# Patient Record
Sex: FEMALE | Race: WHITE | Employment: STUDENT | URBAN - METROPOLITAN AREA
[De-identification: names, ages, dates, MRNs, and addresses within clinical notes are randomized per-mention and may not be internally consistent; named-entity substitution may affect disease eponyms.]

---
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
## 2018-01-08 ENCOUNTER — OFFICE VISIT (OUTPATIENT)
Dept: OBGYN CLINIC | Facility: CLINIC | Age: 18
End: 2018-01-08

## 2018-01-08 VITALS
BODY MASS INDEX: 30.05 KG/M2 | WEIGHT: 176 LBS | SYSTOLIC BLOOD PRESSURE: 118 MMHG | HEIGHT: 64 IN | DIASTOLIC BLOOD PRESSURE: 72 MMHG | HEART RATE: 77 BPM

## 2018-01-08 DIAGNOSIS — Z01.419 WELL WOMAN EXAM WITH ROUTINE GYNECOLOGICAL EXAM: Primary | ICD-10-CM

## 2018-01-08 DIAGNOSIS — Z30.431 IUD CHECK UP: ICD-10-CM

## 2018-01-08 DIAGNOSIS — Z11.3 SCREEN FOR STD (SEXUALLY TRANSMITTED DISEASE): ICD-10-CM

## 2018-01-08 PROCEDURE — 87591 N.GONORRHOEAE DNA AMP PROB: CPT | Performed by: NURSE PRACTITIONER

## 2018-01-08 PROCEDURE — 87491 CHLMYD TRACH DNA AMP PROBE: CPT | Performed by: NURSE PRACTITIONER

## 2018-01-08 PROCEDURE — 99395 PREV VISIT EST AGE 18-39: CPT | Performed by: NURSE PRACTITIONER

## 2018-01-08 NOTE — PROGRESS NOTES
Here for new gynecology visit. 21year old G 0 P 0. Patient's last menstrual period was 12/23/2017 (exact date). .     Here for Annual Gynecologic Exam. She had a Mirena IUD placed 9/2016 and has started having cramps wit her menses since it was inserted. depression, anxiety, migraines, seizure disorders, behavioral problems. /72 (BP Location: Right arm)   Pulse 77   Ht 64\"   Wt 176 lb   LMP 12/23/2017 (Exact Date)   BMI 30.21 kg/m²     NECK:  Thyroid normal size without nodules.  No

## 2018-01-09 LAB
C TRACH DNA SPEC QL NAA+PROBE: NEGATIVE
N GONORRHOEA DNA SPEC QL NAA+PROBE: NEGATIVE

## 2018-01-10 ENCOUNTER — LABORATORY ENCOUNTER (OUTPATIENT)
Dept: LAB | Age: 18
End: 2018-01-10
Attending: OTOLARYNGOLOGY
Payer: COMMERCIAL

## 2018-01-10 DIAGNOSIS — J32.9 CHRONIC SINUSITIS: Primary | ICD-10-CM

## 2018-01-10 PROCEDURE — 88311 DECALCIFY TISSUE: CPT

## 2018-01-10 PROCEDURE — 88300 SURGICAL PATH GROSS: CPT

## 2018-01-10 PROCEDURE — 88304 TISSUE EXAM BY PATHOLOGIST: CPT

## 2018-02-16 ENCOUNTER — TELEPHONE (OUTPATIENT)
Dept: SURGERY | Facility: CLINIC | Age: 18
End: 2018-02-16

## 2018-02-16 NOTE — TELEPHONE ENCOUNTER
Received call from patient, informed her of the approval.  She waiting on a call for a pre-op appt and surgery scheduling.

## 2018-03-06 ENCOUNTER — OFFICE VISIT (OUTPATIENT)
Dept: SURGERY | Facility: CLINIC | Age: 18
End: 2018-03-06

## 2018-03-06 VITALS
SYSTOLIC BLOOD PRESSURE: 120 MMHG | HEIGHT: 64 IN | DIASTOLIC BLOOD PRESSURE: 74 MMHG | OXYGEN SATURATION: 97 % | HEART RATE: 70 BPM | BODY MASS INDEX: 28.31 KG/M2 | WEIGHT: 165.81 LBS

## 2018-03-06 DIAGNOSIS — N62 MACROMASTIA: Primary | ICD-10-CM

## 2018-03-06 PROCEDURE — 99212 OFFICE O/P EST SF 10 MIN: CPT | Performed by: SURGERY

## 2018-03-06 NOTE — PROGRESS NOTES
Liza Thrasher is a 21year old female who presents today for a follow-up. She has been approved for breast reduction and would like to proceed   He denies any changes to her health since her last visit.     Physical Examination:   03/06/18  1342   BP: 1

## 2018-03-06 NOTE — PROGRESS NOTES
Pt given pre-op instructions for Bilateral Breast Reduction. PT would like to be scheduled for surgery in May 2018 due to school schedule. Pt to get medical clearance with PCP sometime in May 2018. Pt agreed and understood.     Informed consent for the proc

## 2018-03-14 ENCOUNTER — TELEPHONE (OUTPATIENT)
Dept: SURGERY | Facility: CLINIC | Age: 18
End: 2018-03-14

## 2018-04-16 ENCOUNTER — TELEPHONE (OUTPATIENT)
Dept: SURGERY | Facility: CLINIC | Age: 18
End: 2018-04-16

## 2018-04-16 NOTE — TELEPHONE ENCOUNTER
Pt called to reschedule procedure. Wants to hold off until June to have surgery.  Rescheduled procedure to 06/20/18

## 2018-05-07 ENCOUNTER — TELEPHONE (OUTPATIENT)
Dept: SURGERY | Facility: CLINIC | Age: 18
End: 2018-05-07

## 2018-05-07 NOTE — TELEPHONE ENCOUNTER
Pt called asking to reschedule surgery for the second time. Patient is enrolled in summer school. She would like to wait until after her class ends in August to have the procedure. Rescheduled procedure to 8/8/18.  Patient would like to know how much is rula

## 2018-05-09 ENCOUNTER — TELEPHONE (OUTPATIENT)
Dept: SURGERY | Facility: CLINIC | Age: 18
End: 2018-05-09

## 2018-05-09 NOTE — TELEPHONE ENCOUNTER
Patient returning my call. Wants to know how much of her breast is going to be reduced during the surgery. Informed her that per Dr Ana Reina note, about 500g will be reduced. Patient would like to know what her size will be after the surgery.  RN suggested

## 2018-05-18 ENCOUNTER — OFFICE VISIT (OUTPATIENT)
Dept: OBGYN CLINIC | Facility: CLINIC | Age: 18
End: 2018-05-18

## 2018-05-18 VITALS
BODY MASS INDEX: 26.98 KG/M2 | WEIGHT: 160 LBS | DIASTOLIC BLOOD PRESSURE: 64 MMHG | HEART RATE: 67 BPM | SYSTOLIC BLOOD PRESSURE: 128 MMHG | HEIGHT: 64.5 IN

## 2018-05-18 DIAGNOSIS — R10.2 PELVIC PAIN: Primary | ICD-10-CM

## 2018-05-18 PROCEDURE — 99213 OFFICE O/P EST LOW 20 MIN: CPT | Performed by: NURSE PRACTITIONER

## 2018-05-18 NOTE — PROGRESS NOTES
S:  Patient is here after experiencing a week of right sided pelvic cramping. She has a Mirena IUD, menses had been monthly with 3 days of light bleeding to spotting. They recently did stop. Her pain started 2 weeks ago and lasted 1 week.  It was all day, s

## 2018-05-25 ENCOUNTER — ULTRASOUND ENCOUNTER (OUTPATIENT)
Dept: OBGYN CLINIC | Facility: CLINIC | Age: 18
End: 2018-05-25

## 2018-05-25 DIAGNOSIS — Z97.5 IUD (INTRAUTERINE DEVICE) IN PLACE: ICD-10-CM

## 2018-05-25 DIAGNOSIS — R10.2 PELVIC PAIN: Primary | ICD-10-CM

## 2018-05-25 PROCEDURE — 76856 US EXAM PELVIC COMPLETE: CPT | Performed by: OBSTETRICS & GYNECOLOGY

## 2018-05-25 PROCEDURE — 76830 TRANSVAGINAL US NON-OB: CPT | Performed by: OBSTETRICS & GYNECOLOGY

## 2018-06-29 ENCOUNTER — TELEPHONE (OUTPATIENT)
Dept: FAMILY MEDICINE CLINIC | Facility: CLINIC | Age: 18
End: 2018-06-29

## 2018-06-29 NOTE — TELEPHONE ENCOUNTER
Patient having breast reduction surgery on 8/8/18 by Dr. Kierra Rdz at the [facility]. Ferris sheet filled out.

## 2018-07-20 ENCOUNTER — OFFICE VISIT (OUTPATIENT)
Dept: FAMILY MEDICINE CLINIC | Facility: CLINIC | Age: 18
End: 2018-07-20
Payer: COMMERCIAL

## 2018-07-20 VITALS
HEIGHT: 64.5 IN | HEART RATE: 60 BPM | WEIGHT: 157 LBS | SYSTOLIC BLOOD PRESSURE: 110 MMHG | BODY MASS INDEX: 26.48 KG/M2 | DIASTOLIC BLOOD PRESSURE: 70 MMHG | OXYGEN SATURATION: 97 % | TEMPERATURE: 99 F | RESPIRATION RATE: 16 BRPM

## 2018-07-20 DIAGNOSIS — Z01.818 PREOPERATIVE GENERAL PHYSICAL EXAMINATION: Primary | ICD-10-CM

## 2018-07-20 PROCEDURE — 99214 OFFICE O/P EST MOD 30 MIN: CPT | Performed by: FAMILY MEDICINE

## 2018-07-20 NOTE — PROGRESS NOTES
Fercho Gonzalez is a 19year old female who presents for a pre-operative physical exam.   HPI related to surgery:   Fercho Gonzalez is scheduled for   Bilateral breast reduction procedure   at [address] on 8/8/18  to be perfo or hives     PHYSICAL EXAM:   /70   Pulse 60   Temp 99.1 °F (37.3 °C) (Oral)   Resp 16   Ht 64.5\"   Wt 157 lb   SpO2 97%   BMI 26.53 kg/m²    Vital signs: Blood pressure 110/70, pulse 60, temperature 99.1 °F (37.3 °C), temperature source Oral, resp.

## 2018-07-23 ENCOUNTER — TELEPHONE (OUTPATIENT)
Dept: SURGERY | Facility: CLINIC | Age: 18
End: 2018-07-23

## 2018-07-23 NOTE — TELEPHONE ENCOUNTER
DEVIN, offering for Dr. Zoran Jimenez to speak with the patient via telephone to field her questions (scheduled breast reduction on 8/8) as she is driving in from [place] for this appointment, as noted.    I will anticipate a call back and arrange for this per fransisca

## 2018-08-07 ENCOUNTER — ANESTHESIA EVENT (OUTPATIENT)
Dept: SURGERY | Facility: HOSPITAL | Age: 18
End: 2018-08-07

## 2018-08-08 ENCOUNTER — ANESTHESIA (OUTPATIENT)
Dept: SURGERY | Facility: HOSPITAL | Age: 18
End: 2018-08-08

## 2018-08-08 ENCOUNTER — SURGERY (OUTPATIENT)
Age: 18
End: 2018-08-08

## 2018-08-08 ENCOUNTER — HOSPITAL ENCOUNTER (OUTPATIENT)
Facility: HOSPITAL | Age: 18
Setting detail: HOSPITAL OUTPATIENT SURGERY
Discharge: HOME | End: 2018-08-08
Attending: SURGERY | Admitting: SURGERY
Payer: COMMERCIAL

## 2018-08-08 VITALS
TEMPERATURE: 99 F | BODY MASS INDEX: 26.77 KG/M2 | SYSTOLIC BLOOD PRESSURE: 127 MMHG | HEIGHT: 64.5 IN | RESPIRATION RATE: 18 BRPM | DIASTOLIC BLOOD PRESSURE: 79 MMHG | OXYGEN SATURATION: 99 % | HEART RATE: 65 BPM | WEIGHT: 158.75 LBS

## 2018-08-08 DIAGNOSIS — N62 MACROMASTIA: ICD-10-CM

## 2018-08-08 LAB
EXPIRATION DATE: 1119
POCT LOT NUMBER: NORMAL
POCT URINE PREGNANCY: NEGATIVE

## 2018-08-08 PROCEDURE — 0HBV0ZZ EXCISION OF BILATERAL BREAST, OPEN APPROACH: ICD-10-PCS | Performed by: SURGERY

## 2018-08-08 PROCEDURE — 88305 TISSUE EXAM BY PATHOLOGIST: CPT | Performed by: SURGERY

## 2018-08-08 PROCEDURE — 81025 URINE PREGNANCY TEST: CPT | Performed by: SURGERY

## 2018-08-08 RX ORDER — MEPERIDINE HYDROCHLORIDE 25 MG/ML
12.5 INJECTION INTRAMUSCULAR; INTRAVENOUS; SUBCUTANEOUS AS NEEDED
Status: DISCONTINUED | OUTPATIENT
Start: 2018-08-08 | End: 2018-08-08

## 2018-08-08 RX ORDER — CEFAZOLIN SODIUM/WATER 2 G/20 ML
2 SYRINGE (ML) INTRAVENOUS ONCE
Status: COMPLETED | OUTPATIENT
Start: 2018-08-08 | End: 2018-08-08

## 2018-08-08 RX ORDER — NALOXONE HYDROCHLORIDE 0.4 MG/ML
80 INJECTION, SOLUTION INTRAMUSCULAR; INTRAVENOUS; SUBCUTANEOUS AS NEEDED
Status: DISCONTINUED | OUTPATIENT
Start: 2018-08-08 | End: 2018-08-08

## 2018-08-08 RX ORDER — ONDANSETRON 2 MG/ML
4 INJECTION INTRAMUSCULAR; INTRAVENOUS AS NEEDED
Status: DISCONTINUED | OUTPATIENT
Start: 2018-08-08 | End: 2018-08-08

## 2018-08-08 RX ORDER — HYDROCODONE BITARTRATE AND ACETAMINOPHEN 5; 325 MG/1; MG/1
2 TABLET ORAL AS NEEDED
Status: COMPLETED | OUTPATIENT
Start: 2018-08-08 | End: 2018-08-08

## 2018-08-08 RX ORDER — MORPHINE SULFATE 4 MG/ML
2 INJECTION, SOLUTION INTRAMUSCULAR; INTRAVENOUS EVERY 5 MIN PRN
Status: DISCONTINUED | OUTPATIENT
Start: 2018-08-08 | End: 2018-08-08

## 2018-08-08 RX ORDER — METOCLOPRAMIDE HYDROCHLORIDE 5 MG/ML
10 INJECTION INTRAMUSCULAR; INTRAVENOUS AS NEEDED
Status: DISCONTINUED | OUTPATIENT
Start: 2018-08-08 | End: 2018-08-08

## 2018-08-08 RX ORDER — SODIUM CHLORIDE, SODIUM LACTATE, POTASSIUM CHLORIDE, CALCIUM CHLORIDE 600; 310; 30; 20 MG/100ML; MG/100ML; MG/100ML; MG/100ML
INJECTION, SOLUTION INTRAVENOUS CONTINUOUS
Status: DISCONTINUED | OUTPATIENT
Start: 2018-08-08 | End: 2018-08-08

## 2018-08-08 RX ORDER — ONDANSETRON 4 MG/1
4 TABLET, FILM COATED ORAL EVERY 8 HOURS PRN
Qty: 20 TABLET | Refills: 0 | Status: SHIPPED | OUTPATIENT
Start: 2018-08-08 | End: 2018-10-15 | Stop reason: ALTCHOICE

## 2018-08-08 RX ORDER — DIPHENHYDRAMINE HYDROCHLORIDE 50 MG/ML
12.5 INJECTION INTRAMUSCULAR; INTRAVENOUS AS NEEDED
Status: DISCONTINUED | OUTPATIENT
Start: 2018-08-08 | End: 2018-08-08

## 2018-08-08 RX ORDER — DOCUSATE SODIUM 100 MG/1
100 CAPSULE, LIQUID FILLED ORAL 2 TIMES DAILY
Qty: 40 CAPSULE | Refills: 0 | Status: SHIPPED | OUTPATIENT
Start: 2018-08-08 | End: 2018-10-15 | Stop reason: ALTCHOICE

## 2018-08-08 RX ORDER — HYDROCODONE BITARTRATE AND ACETAMINOPHEN 5; 325 MG/1; MG/1
1-2 TABLET ORAL EVERY 4 HOURS PRN
Qty: 40 TABLET | Refills: 0 | Status: SHIPPED | OUTPATIENT
Start: 2018-08-08 | End: 2018-10-15 | Stop reason: ALTCHOICE

## 2018-08-08 RX ORDER — ACETAMINOPHEN 500 MG
1000 TABLET ORAL EVERY 6 HOURS PRN
Status: ON HOLD | COMMUNITY
End: 2018-08-08

## 2018-08-08 RX ORDER — HYDROCODONE BITARTRATE AND ACETAMINOPHEN 5; 325 MG/1; MG/1
1 TABLET ORAL AS NEEDED
Status: COMPLETED | OUTPATIENT
Start: 2018-08-08 | End: 2018-08-08

## 2018-08-08 RX ORDER — ACETAMINOPHEN 500 MG
1000 TABLET ORAL ONCE
Status: DISCONTINUED | OUTPATIENT
Start: 2018-08-08 | End: 2018-08-08 | Stop reason: HOSPADM

## 2018-08-08 NOTE — OPERATIVE REPORT
[facility]    PATIENT'S NAME: Shavon SOTO   ATTENDING PHYSICIAN: Jcarlos Steen M.D. OPERATING PHYSICIAN: Jcarlos Steen M.D.    PATIENT ACCOUNT#:   [de-identified]    LOCATION:  [address] [place] [address] 11 EDWP 10  MEDICAL RECORD #:   [id number] intubation. The arms were placed abducted on foam-padded arm boards and loosely secured with Kerlix. The chest was prepped and draped sterilely. The procedure began on the left breast.  The new areola was marked with a 42 mm areolar marker.   The superom first.  The areolar inset was then incised with a scalpel and then excised with electrocautery. The nipple-areolar pedicle was then transposed.   There was some tension at the inferomedial corner which was released with a division of the dermal pedicle med

## 2018-08-08 NOTE — H&P
Dr. Hernandez Prime 7/20/18 H&P / surgical clearance reviewed today. No interval changes. Risks and benefits discussed. Informed consent obtained and a copy of the full consent can be found in our office records. She wishes to proceed as planned.      We review

## 2018-08-08 NOTE — BRIEF OP NOTE
Pre-Operative Diagnosis: Macromastia [N62]     Post-Operative Diagnosis: Macromastia [N62]      Procedure Performed:   Procedure(s):  Bilateral breast reduction    Surgeon(s) and Role:     Manuela Chadwick MD - Primary    Assistant(s):  PA: Marsha Ghotra

## 2018-08-08 NOTE — ANESTHESIA POSTPROCEDURE EVALUATION
[facility] Patient Status:  Hospital Outpatient Surgery   Age/Gender 19year old female MRN [id number]   Location [facility] Attending Evita Delacruz MD   Hosp Day # 0 PCP Heidi Green MD       Ane

## 2018-08-08 NOTE — ANESTHESIA PREPROCEDURE EVALUATION
PRE-OP EVALUATION    Patient Name: Cheyenne Bishop    Pre-op Diagnosis: Macromastia [N62]    Procedure(s):  Bilateral breast reduction    Surgeon(s) and Role:     Alyssa Solano MD - Primary    Pre-op vitals reviewed.         Body mass index is 27.88 beta blockers in last 24 hours. Plan/risks discussed with: patient                Present on Admission:  **None**    We discussed GA w/ETT and possible scratchy throat and rarely dental damage. We discussed analgesic plan and PONV prophylaxis.   The

## 2018-08-16 ENCOUNTER — TELEPHONE (OUTPATIENT)
Dept: SURGERY | Facility: CLINIC | Age: 18
End: 2018-08-16

## 2018-08-16 NOTE — TELEPHONE ENCOUNTER
Pt phoned in to report that over the last 2-3 days she feels like her breasts are more swollen. Her breast reduction surgery was on 8/8/18, and her first post op visit is tomorrow.    Denies fevers or chills, no change in color, just the bruising she has ha

## 2018-08-17 ENCOUNTER — OFFICE VISIT (OUTPATIENT)
Dept: SURGERY | Facility: CLINIC | Age: 18
End: 2018-08-17
Payer: COMMERCIAL

## 2018-08-17 DIAGNOSIS — N62 MACROMASTIA: Primary | ICD-10-CM

## 2018-08-17 PROCEDURE — 99024 POSTOP FOLLOW-UP VISIT: CPT | Performed by: PHYSICIAN ASSISTANT

## 2018-08-17 NOTE — PROGRESS NOTES
This is a 69-year-old female who presents 9 days status post her bilateral breast reduction. She has been using compression as recommended. She has been taking Ibuprofen for pain. Denies nausea or vomiting. Denies chest pain Pain shortness of breath.

## 2018-08-21 ENCOUNTER — OFFICE VISIT (OUTPATIENT)
Dept: SURGERY | Facility: CLINIC | Age: 18
End: 2018-08-21
Payer: COMMERCIAL

## 2018-08-21 DIAGNOSIS — N62 MACROMASTIA: Primary | ICD-10-CM

## 2018-08-21 PROCEDURE — 99024 POSTOP FOLLOW-UP VISIT: CPT | Performed by: SURGERY

## 2018-08-21 NOTE — PROGRESS NOTES
Elias Littlejohn is a 19year old female who presents today for a follow-up. She denies fever and chills. She denies nausea, vomiting, diarrhea or constipation.        Physical Examination:  Breasts: Bilateral nipple areolar complexes are well perfuse

## 2018-09-06 ENCOUNTER — TELEPHONE (OUTPATIENT)
Dept: SURGERY | Facility: CLINIC | Age: 18
End: 2018-09-06

## 2018-09-06 NOTE — TELEPHONE ENCOUNTER
Left Voicemail for Pt to call back regarding requested work letter. Left main line as our call back number. Awaiting call back.

## 2018-09-10 ENCOUNTER — MEDICAL CORRESPONDENCE (OUTPATIENT)
Dept: SURGERY | Facility: CLINIC | Age: 18
End: 2018-09-10

## 2018-09-10 NOTE — PROGRESS NOTES
Per Patients request, a letter for missed work has been generated. Patient was sent work letter via e-mail to the e-mail address she provided as her preferred method of delivery. [email or web address]

## 2018-10-03 ENCOUNTER — TELEPHONE (OUTPATIENT)
Dept: FAMILY MEDICINE CLINIC | Facility: CLINIC | Age: 18
End: 2018-10-03

## 2018-10-15 ENCOUNTER — OFFICE VISIT (OUTPATIENT)
Dept: FAMILY MEDICINE CLINIC | Facility: CLINIC | Age: 18
End: 2018-10-15
Payer: COMMERCIAL

## 2018-10-15 VITALS
OXYGEN SATURATION: 99 % | BODY MASS INDEX: 27.15 KG/M2 | DIASTOLIC BLOOD PRESSURE: 70 MMHG | WEIGHT: 161 LBS | HEART RATE: 62 BPM | TEMPERATURE: 98 F | RESPIRATION RATE: 16 BRPM | SYSTOLIC BLOOD PRESSURE: 114 MMHG | HEIGHT: 64.5 IN

## 2018-10-15 DIAGNOSIS — J45.990 EXERCISE-INDUCED ASTHMA: Primary | ICD-10-CM

## 2018-10-15 DIAGNOSIS — L70.0 ACNE VULGARIS: ICD-10-CM

## 2018-10-15 PROBLEM — Z97.5 IUD (INTRAUTERINE DEVICE) IN PLACE: Status: ACTIVE | Noted: 2017-03-07

## 2018-10-15 PROBLEM — J30.89 ENVIRONMENTAL AND SEASONAL ALLERGIES: Status: ACTIVE | Noted: 2017-09-20

## 2018-10-15 PROCEDURE — 99213 OFFICE O/P EST LOW 20 MIN: CPT | Performed by: FAMILY MEDICINE

## 2018-10-15 RX ORDER — ALBUTEROL SULFATE 90 UG/1
2 AEROSOL, METERED RESPIRATORY (INHALATION) EVERY 6 HOURS PRN
Qty: 1 INHALER | Refills: 3 | Status: SHIPPED | OUTPATIENT
Start: 2018-10-15 | End: 2019-02-19

## 2018-10-15 NOTE — PROGRESS NOTES
[facility] Family Medicine Office Note  Chief Complaint:   Patient presents with:  Asthma: check      HPI:   This is a 19year old female coming in for  HPI  Asthma  Uses albuterol 50% of time with exercise   Had viral sore throat 3 weeks ago. shortness of breath. Cardiovascular: Negative for chest pain and palpitations. Gastrointestinal: Negative for abdominal pain, nausea and vomiting. Genitourinary: Negative for dysuria, frequency and urgency.    Musculoskeletal: Negative for arthralgia Sig: Apply 1 Application topically nightly. Patient/Caregiver Education: Patient/Caregiver Education: There are no barriers to learning. Medical education done. Outcome: Patient verbalizes understanding.  Patient is notified to call with any q

## 2018-10-16 ENCOUNTER — OFFICE VISIT (OUTPATIENT)
Dept: OBGYN CLINIC | Facility: CLINIC | Age: 18
End: 2018-10-16
Payer: COMMERCIAL

## 2018-10-16 ENCOUNTER — OFFICE VISIT (OUTPATIENT)
Dept: SURGERY | Facility: CLINIC | Age: 18
End: 2018-10-16
Payer: COMMERCIAL

## 2018-10-16 DIAGNOSIS — R30.0 DYSURIA: ICD-10-CM

## 2018-10-16 DIAGNOSIS — N62 MACROMASTIA: Primary | ICD-10-CM

## 2018-10-16 DIAGNOSIS — Z87.42 HX OF OVARIAN CYST: Primary | ICD-10-CM

## 2018-10-16 DIAGNOSIS — Z11.3 SCREEN FOR STD (SEXUALLY TRANSMITTED DISEASE): ICD-10-CM

## 2018-10-16 PROCEDURE — 99213 OFFICE O/P EST LOW 20 MIN: CPT | Performed by: OBSTETRICS & GYNECOLOGY

## 2018-10-16 PROCEDURE — 87086 URINE CULTURE/COLONY COUNT: CPT | Performed by: OBSTETRICS & GYNECOLOGY

## 2018-10-16 PROCEDURE — 99024 POSTOP FOLLOW-UP VISIT: CPT | Performed by: SURGERY

## 2018-10-16 PROCEDURE — 87491 CHLMYD TRACH DNA AMP PROBE: CPT | Performed by: OBSTETRICS & GYNECOLOGY

## 2018-10-16 PROCEDURE — 87591 N.GONORRHOEAE DNA AMP PROB: CPT | Performed by: OBSTETRICS & GYNECOLOGY

## 2018-10-16 NOTE — PROGRESS NOTES
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

## 2018-10-16 NOTE — PROGRESS NOTES
Malcolm López is a 19year old female who presents today for a follow-up. She reports noting a few visible sutures in her right periareolar closure. Physical Examination:  Breasts: Bilateral breast incisions are clean dry and intact.   There are

## 2018-10-16 NOTE — PATIENT INSTRUCTIONS
Call [phone] to schedule ultrasound.      Come back when you are 19 for a physical and pap smear

## 2018-12-10 ENCOUNTER — HOSPITAL ENCOUNTER (OUTPATIENT)
Dept: ULTRASOUND IMAGING | Age: 18
Discharge: HOME | End: 2018-12-10
Attending: OBSTETRICS & GYNECOLOGY
Payer: COMMERCIAL

## 2018-12-10 DIAGNOSIS — Z87.42 HX OF OVARIAN CYST: ICD-10-CM

## 2018-12-10 PROCEDURE — 76830 TRANSVAGINAL US NON-OB: CPT | Performed by: OBSTETRICS & GYNECOLOGY

## 2018-12-10 PROCEDURE — 76856 US EXAM PELVIC COMPLETE: CPT | Performed by: OBSTETRICS & GYNECOLOGY

## 2018-12-11 ENCOUNTER — MED REC SCAN ONLY (OUTPATIENT)
Dept: FAMILY MEDICINE CLINIC | Facility: CLINIC | Age: 18
End: 2018-12-11

## 2019-01-11 NOTE — TELEPHONE ENCOUNTER
He had an MRI of the abdomen on 1/11/2019 by Dr. Jonas Hennessy that revealed a solid enhancing mass in the mid left kidney is highly suspicious for malignancy including renal cell carcinoma.     Pt calling back for results

## 2019-02-19 ENCOUNTER — OFFICE VISIT (OUTPATIENT)
Dept: SURGERY | Facility: CLINIC | Age: 19
End: 2019-02-19
Payer: COMMERCIAL

## 2019-02-19 ENCOUNTER — LAB ENCOUNTER (OUTPATIENT)
Dept: LAB | Age: 19
End: 2019-02-19
Attending: FAMILY MEDICINE
Payer: COMMERCIAL

## 2019-02-19 ENCOUNTER — OFFICE VISIT (OUTPATIENT)
Dept: FAMILY MEDICINE CLINIC | Facility: CLINIC | Age: 19
End: 2019-02-19
Payer: COMMERCIAL

## 2019-02-19 VITALS
DIASTOLIC BLOOD PRESSURE: 70 MMHG | SYSTOLIC BLOOD PRESSURE: 110 MMHG | RESPIRATION RATE: 16 BRPM | WEIGHT: 157 LBS | TEMPERATURE: 99 F | BODY MASS INDEX: 26.48 KG/M2 | HEIGHT: 64.5 IN | HEART RATE: 74 BPM | OXYGEN SATURATION: 99 %

## 2019-02-19 DIAGNOSIS — R21 RASH OF PERINEUM: ICD-10-CM

## 2019-02-19 DIAGNOSIS — J06.9 VIRAL URI WITH COUGH: ICD-10-CM

## 2019-02-19 DIAGNOSIS — N62 MACROMASTIA: Primary | ICD-10-CM

## 2019-02-19 DIAGNOSIS — R21 RASH OF PERINEUM: Primary | ICD-10-CM

## 2019-02-19 PROCEDURE — 87389 HIV-1 AG W/HIV-1&-2 AB AG IA: CPT | Performed by: FAMILY MEDICINE

## 2019-02-19 PROCEDURE — 99212 OFFICE O/P EST SF 10 MIN: CPT | Performed by: SURGERY

## 2019-02-19 PROCEDURE — 99214 OFFICE O/P EST MOD 30 MIN: CPT | Performed by: FAMILY MEDICINE

## 2019-02-19 PROCEDURE — 36415 COLL VENOUS BLD VENIPUNCTURE: CPT | Performed by: FAMILY MEDICINE

## 2019-02-19 PROCEDURE — 86694 HERPES SIMPLEX NES ANTBDY: CPT | Performed by: FAMILY MEDICINE

## 2019-02-19 PROCEDURE — 86592 SYPHILIS TEST NON-TREP QUAL: CPT | Performed by: FAMILY MEDICINE

## 2019-02-19 RX ORDER — MUPIROCIN CALCIUM 20 MG/G
1 CREAM TOPICAL 2 TIMES DAILY
Qty: 15 G | Refills: 1 | Status: SHIPPED | OUTPATIENT
Start: 2019-02-19 | End: 2019-02-26

## 2019-02-19 RX ORDER — FLUTICASONE PROPIONATE 50 MCG
2 SPRAY, SUSPENSION (ML) NASAL DAILY
Qty: 1 BOTTLE | Refills: 2 | Status: SHIPPED | OUTPATIENT
Start: 2019-02-19 | End: 2020-02-14

## 2019-02-19 RX ORDER — ALBUTEROL SULFATE 90 UG/1
2 AEROSOL, METERED RESPIRATORY (INHALATION) EVERY 6 HOURS PRN
Qty: 1 INHALER | Refills: 3 | Status: SHIPPED | OUTPATIENT
Start: 2019-02-19

## 2019-02-19 NOTE — PROGRESS NOTES
Elias Littlejohn is a 19year old female who presents today for a follow-up. She is without new complaints. She is applying silicone treatments to her scars. She is overall pleased with the results.     Physical Examination:  Breasts: Bilateral breas

## 2019-02-19 NOTE — PROGRESS NOTES
[facility] Family Medicine Office Note  Chief Complaint:   Patient presents with:  Acrochordon: notice a week ago, patients states she might have 3 under the right buttocks  Cough: x1 week      HPI:   This is a 19year old female coming in for  [facility] ([facility]) MG/5ML Oral Suspension Take 200 mg by mouth every 4 (four) hours as needed for Fever (PRN). Disp:  Rfl:    Tretinoin 0.05 % External Cream Apply 1 Application topically nightly.  Disp: 45 g Rfl: 1   Levonorgestrel 20 MCG/24HR Intrauterine IUD 1 each by Intr Future  Treat initially with abx cream   Rule out STI    2. Viral URI with cough  Supportive care        Return if symptoms worsen or fail to improve.     Meds & Refills for this Visit:  Requested Prescriptions     Signed Prescriptions Disp Refills   • Palmer

## 2019-02-20 LAB
HSV TYPE 1/2 COMBINED AB, IGG: 0.19 IV
TREPONEMA PALLIDUM AB, IGG: 0.4 IV

## 2019-02-21 ENCOUNTER — TELEPHONE (OUTPATIENT)
Dept: FAMILY MEDICINE CLINIC | Facility: CLINIC | Age: 19
End: 2019-02-21

## 2019-02-21 NOTE — TELEPHONE ENCOUNTER
----- Message from Jessee Elizabeth MD sent at 2/21/2019  8:23 AM CST -----  Results reviewed. Tests show no significant abnormalities. Please call patient.

## 2019-05-20 ENCOUNTER — PATIENT OUTREACH (OUTPATIENT)
Dept: FAMILY MEDICINE CLINIC | Facility: CLINIC | Age: 19
End: 2019-05-20

## 2019-07-18 ENCOUNTER — TELEPHONE (OUTPATIENT)
Dept: FAMILY MEDICINE CLINIC | Facility: CLINIC | Age: 19
End: 2019-07-18

## 2019-07-18 NOTE — TELEPHONE ENCOUNTER
Spoke pt regarding results below - she verbalizes understanding. Notes recorded by Bobby Barfield MD on 2/21/2019 at 8:23 AM CST  Results reviewed. Tests show no significant abnormalities. Please call patient.

## 2019-07-18 NOTE — TELEPHONE ENCOUNTER
Patient called regarding lab tests from February, they are not on My Chart and she would like to know what the results were.

## 2019-07-26 ENCOUNTER — TELEPHONE (OUTPATIENT)
Dept: FAMILY MEDICINE CLINIC | Facility: CLINIC | Age: 19
End: 2019-07-26

## 2021-03-21 ENCOUNTER — OFFICE VISIT (OUTPATIENT)
Dept: FAMILY MEDICINE CLINIC | Facility: CLINIC | Age: 21
End: 2021-03-21
Payer: COMMERCIAL

## 2021-03-21 VITALS
BODY MASS INDEX: 23.32 KG/M2 | RESPIRATION RATE: 16 BRPM | HEIGHT: 65 IN | WEIGHT: 140 LBS | DIASTOLIC BLOOD PRESSURE: 72 MMHG | HEART RATE: 79 BPM | TEMPERATURE: 98 F | OXYGEN SATURATION: 99 % | SYSTOLIC BLOOD PRESSURE: 114 MMHG

## 2021-03-21 DIAGNOSIS — K12.2 ORAL INFECTION: Primary | ICD-10-CM

## 2021-03-21 DIAGNOSIS — K11.20 SIALADENITIS: ICD-10-CM

## 2021-03-21 PROCEDURE — 3074F SYST BP LT 130 MM HG: CPT | Performed by: NURSE PRACTITIONER

## 2021-03-21 PROCEDURE — 99213 OFFICE O/P EST LOW 20 MIN: CPT | Performed by: NURSE PRACTITIONER

## 2021-03-21 PROCEDURE — 3078F DIAST BP <80 MM HG: CPT | Performed by: NURSE PRACTITIONER

## 2021-03-21 PROCEDURE — 3008F BODY MASS INDEX DOCD: CPT | Performed by: NURSE PRACTITIONER

## 2021-03-21 RX ORDER — BUPROPION HYDROCHLORIDE 300 MG/1
300 TABLET ORAL DAILY
COMMUNITY

## 2021-03-21 RX ORDER — DEXTROAMPHETAMINE SACCHARATE, AMPHETAMINE ASPARTATE, DEXTROAMPHETAMINE SULFATE AND AMPHETAMINE SULFATE 7.5; 7.5; 7.5; 7.5 MG/1; MG/1; MG/1; MG/1
30 TABLET ORAL DAILY
COMMUNITY

## 2021-03-21 RX ORDER — ALPRAZOLAM 0.5 MG/1
0.5 TABLET ORAL DAILY PRN
COMMUNITY

## 2021-03-21 RX ORDER — AMOXICILLIN AND CLAVULANATE POTASSIUM 875; 125 MG/1; MG/1
1 TABLET, FILM COATED ORAL 2 TIMES DAILY
Qty: 20 TABLET | Refills: 0 | Status: SHIPPED | OUTPATIENT
Start: 2021-03-21 | End: 2021-03-31

## 2021-03-21 NOTE — PROGRESS NOTES
HPI/Subjective:   Hernandez Caldwell is a 23year old female who presents for Infection (possible infected tongue piercing). This past Friday she had her sublingual frenulum pierced at a tattoo parlor.  About 2 hours after, she started to experience incre every 4 (four) hours as needed for Fever (PRN). (Patient not taking: Reported on 3/21/2021 )     • Tretinoin 0.05 % External Cream Apply 1 Application topically nightly.  (Patient not taking: Reported on 3/21/2021 ) 45 g 1       Review of Systems:  Review o Lymphadenopathy:      Cervical: Cervical adenopathy present. Skin:     General: Skin is warm and dry. Neurological:      General: No focal deficit present. Mental Status: She is alert.          Assessment & Plan:   Diagnoses and all orders for th

## 2021-03-21 NOTE — PATIENT INSTRUCTIONS
- Take antibiotic as directed  - Use Magic Mouth Wash as needed for pain  - May use over-the-counter orajel for symptomatic pain relief  - Follow-up if symptoms worsening/not improved in 48-72 hours  - Go to ER for increased welling that affects breathing; Brush and floss your teeth daily. See your dentist for regular cleanings. Follow-up care  Follow up with your healthcare provider, or as advised. See your healthcare provider for further exams and testing.  If you have been referred to a specialist, make

## 2024-10-02 ENCOUNTER — TELEPHONE (OUTPATIENT)
Dept: FAMILY MEDICINE CLINIC | Facility: CLINIC | Age: 24
End: 2024-10-02

## (undated) DEVICE — KENDALL SCD EXPRESS SLEEVES, KNEE LENGTH, MEDIUM: Brand: KENDALL SCD

## (undated) DEVICE — 3M™ IOBAN™ 2 ANTIMICROBIAL INCISE DRAPE 6648EZ: Brand: IOBAN™ 2

## (undated) DEVICE — 3M™ STERI-STRIP™ REINFORCED ADHESIVE SKIN CLOSURES, R1547, 1/2 IN X 4 IN (12 MM X 100 MM), 6 STRIPS/ENVELOPE: Brand: 3M™ STERI-STRIP™

## (undated) DEVICE — PLASTIC BREAST CDS-LF: Brand: MEDLINE INDUSTRIES, INC.

## (undated) DEVICE — PROXIMATE SKIN STAPLERS (35 WIDE) CONTAINS 35 STAINLESS STEEL STAPLES (FIXED HEAD): Brand: PROXIMATE

## (undated) DEVICE — 1010 S-DRAPE TOWEL DRAPE 10/BX: Brand: STERI-DRAPE™

## (undated) DEVICE — SUTURE MONOCRYL 4-0 PS-2

## (undated) DEVICE — CSTM UNIVERSAL DRAPE PK: Brand: MEDLINE INDUSTRIES, INC.

## (undated) DEVICE — BANDAGE ROLL,100% COTTON, 6 PLY, LARGE: Brand: KERLIX

## (undated) DEVICE — CAUTERY BLADE 2IN INS E1455

## (undated) DEVICE — BRA ZIPPERED STYLE 958 LARGE

## (undated) DEVICE — GLOVE SURG SENSICARE SZ 7-1/2

## (undated) DEVICE — DERMABOND LIQUID ADHESIVE

## (undated) DEVICE — SUTURE VICRYL 3-0 SH

## (undated) DEVICE — SOL  .9 1000ML BTL

## (undated) DEVICE — LAPAROTOMY SPONGE - RF AND X-RAY DETECTABLE PRE-WASHED: Brand: SITUATE

## (undated) DEVICE — APPLICATOR COTTON TIP 3 10/PK

## (undated) DEVICE — 40580 - THE PINK PAD - ADVANCED TRENDELENBURG POSITIONING KIT: Brand: 40580 - THE PINK PAD - ADVANCED TRENDELENBURG POSITIONING KIT

## (undated) DEVICE — 3M™ STERI-STRIP™ REINFORCED ADHESIVE SKIN CLOSURES, R1548, 1 IN X 5 IN (25 MM X 125 MM), 4 STRIPS/ENVELOPE: Brand: 3M™ STERI-STRIP™

## (undated) DEVICE — LAMINECTOMY ARM CRADLE FOAM POSITIONER: Brand: CARDINAL HEALTH

## (undated) NOTE — MR AVS SNAPSHOT
[address] [facility] [address] [phone]               Thank you for choosing us for your health care visit with Sathish Rojas MD.  We are glad to serve you and happy to provide you with this summary of your Probiotic 250 MG Caps   Take by mouth. WOMENS MULTI VITAMIN & MINERAL OR   Take by mouth. Where to Get Your Medications      These medications were sent to [facility], [address] [place] [address].  [phone], 815-7 Visit [facility] online at  [email or web address]

## (undated) NOTE — LETTER
ASTHMA ACTION PLAN for Malcolm López     : 2000     Date: 2018  Provider:  Gorge Carr MD  Phone for doctor or clinic: [facility], [address]  [address] [address]  [facility] [address] [phone]    ACT Score: 25